# Patient Record
Sex: MALE | Race: ASIAN | NOT HISPANIC OR LATINO
[De-identification: names, ages, dates, MRNs, and addresses within clinical notes are randomized per-mention and may not be internally consistent; named-entity substitution may affect disease eponyms.]

---

## 2018-10-02 PROBLEM — Z00.00 ENCOUNTER FOR PREVENTIVE HEALTH EXAMINATION: Status: ACTIVE | Noted: 2018-10-02

## 2018-12-12 ENCOUNTER — APPOINTMENT (OUTPATIENT)
Dept: PAIN MANAGEMENT | Facility: CLINIC | Age: 23
End: 2018-12-12

## 2018-12-17 ENCOUNTER — APPOINTMENT (OUTPATIENT)
Dept: PAIN MANAGEMENT | Facility: CLINIC | Age: 23
End: 2018-12-17
Payer: COMMERCIAL

## 2018-12-17 VITALS
DIASTOLIC BLOOD PRESSURE: 79 MMHG | BODY MASS INDEX: 23.34 KG/M2 | WEIGHT: 154 LBS | HEIGHT: 68 IN | SYSTOLIC BLOOD PRESSURE: 140 MMHG | HEART RATE: 72 BPM

## 2018-12-17 PROCEDURE — 99204 OFFICE O/P NEW MOD 45 MIN: CPT

## 2021-03-22 ENCOUNTER — NON-APPOINTMENT (OUTPATIENT)
Age: 26
End: 2021-03-22

## 2021-03-22 ENCOUNTER — APPOINTMENT (OUTPATIENT)
Dept: DERMATOLOGY | Facility: CLINIC | Age: 26
End: 2021-03-22
Payer: COMMERCIAL

## 2021-03-22 VITALS — HEIGHT: 68 IN | BODY MASS INDEX: 23.49 KG/M2 | WEIGHT: 155 LBS

## 2021-03-22 DIAGNOSIS — L73.0 ACNE KELOID: ICD-10-CM

## 2021-03-22 PROCEDURE — 99204 OFFICE O/P NEW MOD 45 MIN: CPT

## 2021-03-22 PROCEDURE — 99072 ADDL SUPL MATRL&STAF TM PHE: CPT

## 2021-03-22 RX ORDER — FLUOCINONIDE 0.5 MG/ML
0.05 SOLUTION TOPICAL
Qty: 1 | Refills: 3 | Status: ACTIVE | COMMUNITY
Start: 2021-03-22 | End: 1900-01-01

## 2021-04-01 ENCOUNTER — NON-APPOINTMENT (OUTPATIENT)
Age: 26
End: 2021-04-01

## 2021-04-01 ENCOUNTER — APPOINTMENT (OUTPATIENT)
Dept: OPHTHALMOLOGY | Facility: CLINIC | Age: 26
End: 2021-04-01
Payer: COMMERCIAL

## 2021-04-01 PROCEDURE — 99072 ADDL SUPL MATRL&STAF TM PHE: CPT

## 2021-04-01 PROCEDURE — 99204 OFFICE O/P NEW MOD 45 MIN: CPT

## 2021-04-01 PROCEDURE — 92083 EXTENDED VISUAL FIELD XM: CPT

## 2021-05-06 ENCOUNTER — APPOINTMENT (OUTPATIENT)
Dept: OPHTHALMOLOGY | Facility: CLINIC | Age: 26
End: 2021-05-06

## 2021-06-08 ENCOUNTER — APPOINTMENT (OUTPATIENT)
Dept: ENDOCRINOLOGY | Facility: CLINIC | Age: 26
End: 2021-06-08

## 2021-06-22 ENCOUNTER — APPOINTMENT (OUTPATIENT)
Dept: DERMATOLOGY | Facility: CLINIC | Age: 26
End: 2021-06-22
Payer: COMMERCIAL

## 2021-06-22 DIAGNOSIS — L72.0 EPIDERMAL CYST: ICD-10-CM

## 2021-06-22 PROCEDURE — 99214 OFFICE O/P EST MOD 30 MIN: CPT | Mod: 25

## 2021-06-22 PROCEDURE — 11900 INJECT SKIN LESIONS </W 7: CPT

## 2021-09-29 ENCOUNTER — APPOINTMENT (OUTPATIENT)
Dept: ORTHOPEDIC SURGERY | Facility: CLINIC | Age: 26
End: 2021-09-29
Payer: COMMERCIAL

## 2021-09-29 ENCOUNTER — NON-APPOINTMENT (OUTPATIENT)
Age: 26
End: 2021-09-29

## 2021-09-29 VITALS
SYSTOLIC BLOOD PRESSURE: 126 MMHG | WEIGHT: 150 LBS | BODY MASS INDEX: 22.22 KG/M2 | DIASTOLIC BLOOD PRESSURE: 80 MMHG | HEART RATE: 59 BPM | HEIGHT: 69 IN

## 2021-09-29 PROCEDURE — 20550 NJX 1 TENDON SHEATH/LIGAMENT: CPT | Mod: LT

## 2021-09-29 PROCEDURE — 73130 X-RAY EXAM OF HAND: CPT | Mod: LT

## 2021-09-29 PROCEDURE — 73140 X-RAY EXAM OF FINGER(S): CPT | Mod: RT

## 2021-09-29 PROCEDURE — 99204 OFFICE O/P NEW MOD 45 MIN: CPT | Mod: 25

## 2021-09-29 RX ADMIN — BETAMETHASONE SODIUM PHOSPHATE AND BETAMETHASONE ACETATE 1 MG/ML: 3; 3 INJECTION, SUSPENSION INTRA-ARTICULAR; INTRALESIONAL; INTRAMUSCULAR; SOFT TISSUE at 00:00

## 2021-09-29 RX ADMIN — LIDOCAINE HYDROCHLORIDE 0.5 %: 10 INJECTION, SOLUTION INFILTRATION; PERINEURAL at 00:00

## 2021-09-29 NOTE — DISCUSSION/SUMMARY
[FreeTextEntry1] : He has findings consistent with a right thumb MCP joint radial collateral ligament sprain after an injury while playing basketball 13 days ago without instability.  He also has what appears to be a mild chronic left index finger trigger finger.\par \par I had a discussion with the patient regarding today's visit, the prognosis of this diagnosis, and treatment recommendations and options. With regard to the right thumb injury, he was fitted with a thumb spica splint and instructed on activity modification.  We will follow-up in 3 to 4 weeks to assess his progress.\par \par With regard to the left index finger, I recommended at this time a cortisone injection into the flexor tendon sheath. I told him I cannot guarantee that this will provide him with long term relief and if it does not I would then recommend he return to this office to discuss further treatment options. \par \par He has agreed to the above plan of management and has expressed full understanding.  All questions were fully answered to their satisfaction. \par \par My cumulative time spent on this visit included: Preparation for the visit, review of the medical records, review of pertinent diagnostic studies, examination and counseling of the patient on the above diagnosis, treatment plan and prognosis, orders of diagnostic tests, medication and/or appropriate procedures and documentation in the medical records of today's visit.

## 2021-09-29 NOTE — END OF VISIT
[FreeTextEntry3] : This note was written by Sabina Cervantes on 09/29/2021 acting solely as a scribe for Dr. Pramod Lunsford.\par  \par All medical record entries made by the Scribe were at my, Dr. Pramod Lunsford, direction and personally dictated by me on 09/29/2021. I have personally reviewed the chart and agree that the record accurately reflects my personal performance of the history, physical exam, assessment and plan.

## 2021-09-29 NOTE — PHYSICAL EXAM
[de-identified] : - Constitutional: This is a male in no obvious distress.  \par - Psych: Patient is alert and oriented to person, place and time.  Patient has a normal mood and affect.\par - Cardiovascular: Normal pulses throughout the upper extremities.  No significant varicosities are noted in the upper extremities. \par - Neuro: Strength and sensation are intact throughout the upper extremities.  Patient has normal coordination.\par - Respiratory:  Patient exhibits no evidence of shortness of breath or difficulty breathing.\par - Skin: No rashes, lesions, or other abnormalities are noted in the upper extremities.\par \par --- \par \par Examination of his right thumb demonstrates no obvious swelling.  He is tender along the MCP joint radial collateral ligament.  There is no swelling or tenderness along the ulnar collateral ligament.  There is no instability to stress testing of the MCP joint radial or ulnar collateral ligaments.  He is neurovascular intact distally.\par \par Examination of his left hand demonstrates no obvious swelling at the index finger.  He has mild tenderness along the PIP joint of the index finger.  There is triggering with flexion and extension of the digit.  There is no localized swelling or tenderness along the A1 pulley.  He has full motion.  He is neurovascularly intact distally. [de-identified] : AP, lateral, and oblique radiographs of the right thumb demonstrate no obvious fractures or dislocations.\par \par AP, lateral and oblique radiographs of his left hand demonstrate no arthritis or bony or soft tissue abnormalities.

## 2021-09-29 NOTE — PROCEDURE
[FreeTextEntry1] : -  After a discussion of risks and benefits, the patient agreed to proceed with a cortisone injection.  \par -  Side: Left\par -  Finger: Index trigger finger\par -  Medications: 0.5 cc of 1% Lidocaine and 1 cc of betamethasone, 6mg/cc, using sterile technique.\par -  Patient tolerated the procedure well, without complications.\par -  Patient was told that the symptoms may worsen for a day or two, and should then begin to improve. \par -  Instructions: Patient was instructed on activity modification for the next several days.\par -  Follow-up: Within 4 weeks to assess response to the injection.

## 2021-09-29 NOTE — HISTORY OF PRESENT ILLNESS
[Right] : right hand dominant [FreeTextEntry1] : He comes in today for evaluation of a right thumb injury which occurred on 9/16/21 at which time he was playing basketball. He notes swelling and a loss of dexterity. He has difficulty with gripping, He rates his pain as a 5 out of 10 at this time. He has a second complain of left index finger pain. He denies injury but notes the finger has been stiff for quite some time. He denies injury. He complains of snapping and cracking to the finger. He rates his pain as a 3 out of 10 at this time.

## 2021-09-29 NOTE — ADDENDUM
[FreeTextEntry1] : I, Sabina Cervantes, acted solely as a scribe for Dr. Lunsford on this date on 09/29/2021.

## 2021-10-01 RX ORDER — BETAMETHA AC,SOD PHOS/WATER/PF 6 MG/ML
6 (3-3) VIAL (ML) INJECTION
Qty: 1 | Refills: 0 | Status: COMPLETED | OUTPATIENT
Start: 2021-09-29

## 2021-10-01 RX ORDER — LIDOCAINE HYDROCHLORIDE 10 MG/ML
1 INJECTION, SOLUTION INFILTRATION; PERINEURAL
Refills: 0 | Status: COMPLETED | OUTPATIENT
Start: 2021-09-29

## 2021-10-10 ENCOUNTER — NON-APPOINTMENT (OUTPATIENT)
Age: 26
End: 2021-10-10

## 2021-10-13 PROBLEM — M65.322 TRIGGER INDEX FINGER OF LEFT HAND: Status: ACTIVE | Noted: 2021-09-29

## 2021-10-13 PROBLEM — S63.641A SPRAIN OF METACARPOPHALANGEAL (MCP) JOINT OF RIGHT THUMB: Status: ACTIVE | Noted: 2021-09-29

## 2021-10-20 ENCOUNTER — APPOINTMENT (OUTPATIENT)
Dept: ORTHOPEDIC SURGERY | Facility: CLINIC | Age: 26
End: 2021-10-20
Payer: COMMERCIAL

## 2021-10-20 DIAGNOSIS — S63.641A SPRAIN OF METACARPOPHALANGEAL JOINT OF RIGHT THUMB, INITIAL ENCOUNTER: ICD-10-CM

## 2021-10-20 DIAGNOSIS — M65.322 TRIGGER FINGER, LEFT INDEX FINGER: ICD-10-CM

## 2021-10-20 PROCEDURE — 99214 OFFICE O/P EST MOD 30 MIN: CPT

## 2021-10-20 NOTE — HISTORY OF PRESENT ILLNESS
[FreeTextEntry1] : Follow-up regarding right thumb MCP joint radial collateral ligament sprain after an injury while playing basketball 5 weeks ago and a mild chronic left index finger trigger finger.\par \par See note from when he was seen in the office 3 weeks ago.  He was fitted with a right thumb spica splint and given a cortisone injection at his left index finger trigger finger.\par \par He notes less pain to the right thumb with improvement noted. He has been wearing a right thumb spica splint. He rates his pain currently as a 3 out of 10 in this regard. With regard to the left index finger trigger finger, he also notes improvements and denies locking and pain. He does however state the finger intermittency clicks.

## 2021-10-20 NOTE — END OF VISIT
[FreeTextEntry3] : This note was written by Sabina Cervantes on 10/20/2021 acting solely as a scribe for Dr. Pramod Lunsford.\par  \par All medical record entries made by the Scribe were at my, Dr. Pramod Lunsford, direction and personally dictated by me on 10/20/2021. I have personally reviewed the chart and agree that the record accurately reflects my personal performance of the history, physical exam, assessment and plan.

## 2021-10-20 NOTE — ADDENDUM
[FreeTextEntry1] : I, Sabina Cervantes, acted solely as a scribe for Dr. Lunsford on this date on 10/20/2021.

## 2021-10-20 NOTE — DISCUSSION/SUMMARY
[FreeTextEntry1] : I had a discussion regarding today's visit, the diagnosis and treatment recommendations and options.  We also discussed changes since the last visit.  \par \par With regard to the right thumb, he was instructed to begin weaning off of the splint. He was instructed on activity modification for another one month. If he is feeling better after 1 month, he may return to regular activities as tolerated. He will follow up on an as needed basis in this regard.\par \par With regard to the left index finger trigger finger, as his symptoms have improved, I have recommended observation. He will follow up if his symptoms worsen in the future.\par \par The patient has agreed to the above plan of management and has expressed full understanding.  All questions were fully answered to the patient's satisfaction.\par \par My cumulative time spent on today's visit was greater than 30 minutes and included: Preparation for the visit, review of the medical records, review of pertinent diagnostic studies, examination and counseling of the patient on the above diagnosis, treatment plan and prognosis, orders of diagnostic tests, medications and/or appropriate procedures and documentation in the medical records of today's visit.

## 2021-10-20 NOTE — PHYSICAL EXAM
[de-identified] : - Constitutional: This is a male in no obvious distress.  \par - Psych: Patient is alert and oriented to person, place and time.  Patient has a normal mood and affect.\par - Cardiovascular: Normal pulses throughout the upper extremities.  No significant varicosities are noted in the upper extremities. \par - Neuro: Strength and sensation are intact throughout the upper extremities.  Patient has normal coordination.\par - Respiratory:  Patient exhibits no evidence of shortness of breath or difficulty breathing.\par - Skin: No rashes, lesions, or other abnormalities are noted in the upper extremities.\par \par --- \par \par Examination of his right thumb demonstrates no obvious swelling.  He is no longer tender along the MCP joint radial collateral ligament.  There is no swelling or tenderness along the ulnar collateral ligament.  There is no instability to stress testing of the MCP joint radial or ulnar collateral ligaments.  He is neurovascular intact distally.\par \par Examination of his left hand demonstrates no obvious swelling at the index finger.  He has mild residual tenderness along the PIP joint of the index finger.  There is residual triggering which is much improved.  He has full motion.  He remains neurovascularly intact distally. [de-identified] : Previous AP, lateral, and oblique radiographs of the right thumb demonstrated no obvious fractures or dislocations.\par \par Previous AP, lateral and oblique radiographs of his left hand demonstrated no arthritis or bony or soft tissue abnormalities.

## 2021-12-07 ENCOUNTER — APPOINTMENT (OUTPATIENT)
Dept: DERMATOLOGY | Facility: CLINIC | Age: 26
End: 2021-12-07
Payer: COMMERCIAL

## 2021-12-07 DIAGNOSIS — L73.9 FOLLICULAR DISORDER, UNSPECIFIED: ICD-10-CM

## 2021-12-07 DIAGNOSIS — L64.9 ANDROGENIC ALOPECIA, UNSPECIFIED: ICD-10-CM

## 2021-12-07 PROCEDURE — 99214 OFFICE O/P EST MOD 30 MIN: CPT | Mod: GC

## 2021-12-07 RX ORDER — TRETINOIN 0.25 MG/G
0.03 CREAM TOPICAL
Qty: 1 | Refills: 3 | Status: ACTIVE | COMMUNITY
Start: 2021-03-22 | End: 1900-01-01

## 2022-02-03 ENCOUNTER — APPOINTMENT (OUTPATIENT)
Dept: OTOLARYNGOLOGY | Facility: CLINIC | Age: 27
End: 2022-02-03
Payer: COMMERCIAL

## 2022-02-03 VITALS
BODY MASS INDEX: 22.73 KG/M2 | TEMPERATURE: 98 F | WEIGHT: 150 LBS | DIASTOLIC BLOOD PRESSURE: 79 MMHG | SYSTOLIC BLOOD PRESSURE: 128 MMHG | HEART RATE: 66 BPM | HEIGHT: 68 IN

## 2022-02-03 DIAGNOSIS — J31.0 CHRONIC RHINITIS: ICD-10-CM

## 2022-02-03 DIAGNOSIS — J34.2 DEVIATED NASAL SEPTUM: ICD-10-CM

## 2022-02-03 DIAGNOSIS — Z83.3 FAMILY HISTORY OF DIABETES MELLITUS: ICD-10-CM

## 2022-02-03 DIAGNOSIS — D75.1 SECONDARY POLYCYTHEMIA: ICD-10-CM

## 2022-02-03 DIAGNOSIS — J34.3 HYPERTROPHY OF NASAL TURBINATES: ICD-10-CM

## 2022-02-03 DIAGNOSIS — Z78.9 OTHER SPECIFIED HEALTH STATUS: ICD-10-CM

## 2022-02-03 DIAGNOSIS — M95.0 ACQUIRED DEFORMITY OF NOSE: ICD-10-CM

## 2022-02-03 PROCEDURE — 31231 NASAL ENDOSCOPY DX: CPT

## 2022-02-03 PROCEDURE — 99203 OFFICE O/P NEW LOW 30 MIN: CPT | Mod: 25

## 2022-02-03 RX ORDER — DOXYCYCLINE HYCLATE 100 MG/1
100 CAPSULE ORAL
Qty: 60 | Refills: 2 | Status: DISCONTINUED | COMMUNITY
Start: 2021-03-22 | End: 2022-02-03

## 2022-02-03 RX ORDER — MAGNESIUM OXIDE/MAG AA CHELATE 300 MG
CAPSULE ORAL
Refills: 0 | Status: ACTIVE | COMMUNITY

## 2022-02-03 RX ORDER — DOXYCYCLINE 100 MG/1
100 TABLET, FILM COATED ORAL
Refills: 0 | Status: DISCONTINUED | COMMUNITY
End: 2022-02-03

## 2022-02-03 RX ORDER — DOXYCYCLINE HYCLATE 100 MG/1
100 TABLET, DELAYED RELEASE ORAL
Qty: 30 | Refills: 2 | Status: DISCONTINUED | COMMUNITY
Start: 2021-06-22 | End: 2022-02-03

## 2022-02-03 RX ORDER — SODIUM CHLORIDE 0.65 %
0.65 AEROSOL, SPRAY (ML) NASAL
Qty: 1 | Refills: 2 | Status: ACTIVE | COMMUNITY
Start: 2022-02-03 | End: 1900-01-01

## 2022-02-03 RX ORDER — FLUTICASONE PROPIONATE 50 UG/1
50 SPRAY, METERED NASAL TWICE DAILY
Qty: 1 | Refills: 2 | Status: ACTIVE | COMMUNITY
Start: 2022-02-03 | End: 1900-01-01

## 2022-02-03 NOTE — REASON FOR VISIT
[Initial Evaluation] : an initial evaluation for [FreeTextEntry2] : difficulty breathing out of nose

## 2022-02-03 NOTE — PROCEDURE
[FreeTextEntry1] : Nasal endoscopy [FreeTextEntry2] : Nasal obstruction [FreeTextEntry3] : Procedure: nasal endoscopy \par \par Pre-operative diagnosis:  nasal obstruction \par \par Indication: Anterior rhinoscopy insufficient to diagnose pathology\par \par Details:\par After decongestant and lidocaine was sprayed in the bilateral nasal cavities, a flexible laryngoscope was inserted into the right nares. The nasal cavity, middle meatus, nasopharynx, and glottis were visualized. The endoscope was then inserted into the left nares and the nasal cavity was visualized. The patient tolerated procedure well.\par \par Results:\par Left septal deviation\par Right nasal cavity: clear without masses or lesions, clear secretions\par Right inferior turbinate: edematous, hypertrophy\par Right middle turbinate: normal\par Right middle meatus: normal without masses, pus\par Right ETO: normal\par Left nasal cavity: clear without masses or lesions, clear secretions\par Left inferior turbinate: edematous, hypertrophy\par Left middle turbinate: normal\par Left middle meatus: normal without masses, pus\par Left ETO: normal\par Nasopharynx: normal without masses or lesions\par \par Findings:\par Bilateral inferior turbinate hypertrophy, septal deviation

## 2022-02-03 NOTE — PHYSICAL EXAM
[] : septum deviated to the left [de-identified] : hypertrophy, edema [Midline] : trachea located in midline position [Normal] : no rashes

## 2022-02-03 NOTE — ASSESSMENT
[FreeTextEntry1] : 26 year old male with left septal deviation, bilateral inferior turbinate hypertrophy. We will trial flonase and nss. He will return in 6-8 weeks.

## 2022-02-03 NOTE — HISTORY OF PRESENT ILLNESS
[de-identified] : 26 year male presents for initial evaluation for difficulty breathing out of nose\par Recently diagnosed with Polycythemia \par Reports nasal trauma in 07/2021 he got hit in the face with a basketball.\par States intermittent nasal congestion, intermittent sinus pain/pressure.\par Denies post nasal drip, nasal discharge. States/denies sinus infections in the past year, treated with antibiotics.  Denies use of steroidal nasal sprays in the past. Denies CAT scan of sinuses in. \par SCHNOS O: 75\par SCHNOS C: 0\par NOSE: 65

## 2022-02-18 ENCOUNTER — APPOINTMENT (OUTPATIENT)
Dept: DERMATOLOGY | Facility: CLINIC | Age: 27
End: 2022-02-18
Payer: COMMERCIAL

## 2022-02-18 DIAGNOSIS — L70.0 ACNE VULGARIS: ICD-10-CM

## 2022-02-18 PROCEDURE — 99214 OFFICE O/P EST MOD 30 MIN: CPT

## 2022-02-18 RX ORDER — CLINDAMYCIN PHOSPHATE 10 MG/ML
1 LOTION TOPICAL
Qty: 1 | Refills: 2 | Status: ACTIVE | COMMUNITY
Start: 2021-03-22 | End: 1900-01-01

## 2022-02-18 RX ORDER — KETOCONAZOLE 20.5 MG/ML
2 SHAMPOO, SUSPENSION TOPICAL
Qty: 1 | Refills: 4 | Status: ACTIVE | COMMUNITY
Start: 2021-03-22 | End: 1900-01-01

## 2022-03-29 ENCOUNTER — APPOINTMENT (OUTPATIENT)
Dept: OPHTHALMOLOGY | Facility: CLINIC | Age: 27
End: 2022-03-29

## 2022-03-31 ENCOUNTER — APPOINTMENT (OUTPATIENT)
Dept: OTOLARYNGOLOGY | Facility: CLINIC | Age: 27
End: 2022-03-31

## 2022-05-13 ENCOUNTER — APPOINTMENT (OUTPATIENT)
Dept: DERMATOLOGY | Facility: CLINIC | Age: 27
End: 2022-05-13
Payer: COMMERCIAL

## 2022-05-13 DIAGNOSIS — L21.9 SEBORRHEIC DERMATITIS, UNSPECIFIED: ICD-10-CM

## 2022-05-13 PROCEDURE — 99214 OFFICE O/P EST MOD 30 MIN: CPT

## 2022-05-13 RX ORDER — CLOBETASOL PROPIONATE 0.5 MG/ML
0.05 SOLUTION TOPICAL
Qty: 1 | Refills: 2 | Status: ACTIVE | COMMUNITY
Start: 2022-05-13 | End: 1900-01-01

## 2022-05-16 LAB
ALBUMIN SERPL ELPH-MCNC: 5 G/DL
ALP BLD-CCNC: 64 U/L
ALT SERPL-CCNC: 14 U/L
AST SERPL-CCNC: 15 U/L
BASOPHILS # BLD AUTO: 0.04 K/UL
BASOPHILS NFR BLD AUTO: 0.6 %
BILIRUB DIRECT SERPL-MCNC: 0.2 MG/DL
BILIRUB INDIRECT SERPL-MCNC: 0.7 MG/DL
BILIRUB SERPL-MCNC: 0.9 MG/DL
EOSINOPHIL # BLD AUTO: 0.12 K/UL
EOSINOPHIL NFR BLD AUTO: 1.9 %
HCT VFR BLD CALC: 53.8 %
HGB BLD-MCNC: 18.3 G/DL
IMM GRANULOCYTES NFR BLD AUTO: 0.2 %
LYMPHOCYTES # BLD AUTO: 1.15 K/UL
LYMPHOCYTES NFR BLD AUTO: 18.6 %
MAN DIFF?: NORMAL
MCHC RBC-ENTMCNC: 30.2 PG
MCHC RBC-ENTMCNC: 34 GM/DL
MCV RBC AUTO: 88.8 FL
MONOCYTES # BLD AUTO: 0.56 K/UL
MONOCYTES NFR BLD AUTO: 9.1 %
NEUTROPHILS # BLD AUTO: 4.29 K/UL
NEUTROPHILS NFR BLD AUTO: 69.6 %
PLATELET # BLD AUTO: 171 K/UL
PROT SERPL-MCNC: 7.8 G/DL
RBC # BLD: 6.06 M/UL
RBC # FLD: 13.8 %
TRIGL SERPL-MCNC: 65 MG/DL
WBC # FLD AUTO: 6.17 K/UL

## 2022-08-05 ENCOUNTER — TRANSCRIPTION ENCOUNTER (OUTPATIENT)
Age: 27
End: 2022-08-05

## 2022-09-12 ENCOUNTER — APPOINTMENT (OUTPATIENT)
Dept: DERMATOLOGY | Facility: CLINIC | Age: 27
End: 2022-09-12

## 2022-09-12 PROCEDURE — 99214 OFFICE O/P EST MOD 30 MIN: CPT

## 2022-09-14 ENCOUNTER — NON-APPOINTMENT (OUTPATIENT)
Age: 27
End: 2022-09-14

## 2022-09-14 LAB
ALBUMIN SERPL ELPH-MCNC: 5.1 G/DL
ALP BLD-CCNC: 66 U/L
ALT SERPL-CCNC: 112 U/L
AST SERPL-CCNC: 67 U/L
BILIRUB DIRECT SERPL-MCNC: 0.2 MG/DL
BILIRUB INDIRECT SERPL-MCNC: 0.6 MG/DL
BILIRUB SERPL-MCNC: 0.7 MG/DL
PROT SERPL-MCNC: 7.8 G/DL
TRIGL SERPL-MCNC: 67 MG/DL

## 2022-09-16 LAB
ALBUMIN SERPL ELPH-MCNC: 5 G/DL
ALP BLD-CCNC: 63 U/L
ALT SERPL-CCNC: 73 U/L
AST SERPL-CCNC: 23 U/L
BILIRUB DIRECT SERPL-MCNC: 0.2 MG/DL
BILIRUB INDIRECT SERPL-MCNC: 0.6 MG/DL
BILIRUB SERPL-MCNC: 0.8 MG/DL
PROT SERPL-MCNC: 7.8 G/DL

## 2022-10-11 ENCOUNTER — APPOINTMENT (OUTPATIENT)
Dept: DERMATOLOGY | Facility: CLINIC | Age: 27
End: 2022-10-11

## 2022-11-04 ENCOUNTER — APPOINTMENT (OUTPATIENT)
Dept: DERMATOLOGY | Facility: CLINIC | Age: 27
End: 2022-11-04

## 2022-11-04 PROCEDURE — 99214 OFFICE O/P EST MOD 30 MIN: CPT

## 2022-12-13 ENCOUNTER — APPOINTMENT (OUTPATIENT)
Dept: DERMATOLOGY | Facility: CLINIC | Age: 27
End: 2022-12-13

## 2023-02-02 ENCOUNTER — APPOINTMENT (OUTPATIENT)
Dept: DERMATOLOGY | Facility: CLINIC | Age: 28
End: 2023-02-02

## 2023-02-03 ENCOUNTER — APPOINTMENT (OUTPATIENT)
Dept: DERMATOLOGY | Facility: CLINIC | Age: 28
End: 2023-02-03
Payer: COMMERCIAL

## 2023-02-03 PROCEDURE — 99214 OFFICE O/P EST MOD 30 MIN: CPT

## 2023-03-03 ENCOUNTER — APPOINTMENT (OUTPATIENT)
Dept: DERMATOLOGY | Facility: CLINIC | Age: 28
End: 2023-03-03
Payer: COMMERCIAL

## 2023-03-23 ENCOUNTER — NON-APPOINTMENT (OUTPATIENT)
Age: 28
End: 2023-03-23

## 2023-03-24 ENCOUNTER — APPOINTMENT (OUTPATIENT)
Dept: DERMATOLOGY | Facility: CLINIC | Age: 28
End: 2023-03-24
Payer: COMMERCIAL

## 2023-03-24 DIAGNOSIS — L91.0 HYPERTROPHIC SCAR: ICD-10-CM

## 2023-03-24 PROCEDURE — 99214 OFFICE O/P EST MOD 30 MIN: CPT

## 2023-03-24 RX ORDER — CLOBETASOL PROPIONATE 0.5 MG/G
0.05 CREAM TOPICAL
Qty: 1 | Refills: 1 | Status: ACTIVE | COMMUNITY
Start: 2023-03-24 | End: 1900-01-01

## 2023-03-24 RX ORDER — ISOTRETINOIN 30 MG/1
30 CAPSULE, LIQUID FILLED ORAL
Qty: 1 | Refills: 0 | Status: ACTIVE | COMMUNITY
Start: 2022-11-04 | End: 1900-01-01

## 2023-03-27 ENCOUNTER — TRANSCRIPTION ENCOUNTER (OUTPATIENT)
Age: 28
End: 2023-03-27

## 2023-03-27 LAB
ALBUMIN SERPL ELPH-MCNC: 5.1 G/DL
ALP BLD-CCNC: 62 U/L
ALT SERPL-CCNC: 17 U/L
AST SERPL-CCNC: 16 U/L
BILIRUB DIRECT SERPL-MCNC: 0.2 MG/DL
BILIRUB INDIRECT SERPL-MCNC: 0.6 MG/DL
BILIRUB SERPL-MCNC: 0.8 MG/DL
PROT SERPL-MCNC: 7.9 G/DL
TRIGL SERPL-MCNC: 84 MG/DL

## 2023-04-17 ENCOUNTER — APPOINTMENT (OUTPATIENT)
Dept: DERMATOLOGY | Facility: CLINIC | Age: 28
End: 2023-04-17
Payer: COMMERCIAL

## 2023-04-17 DIAGNOSIS — L70.0 ACNE VULGARIS: ICD-10-CM

## 2023-04-17 DIAGNOSIS — Z79.899 OTHER LONG TERM (CURRENT) DRUG THERAPY: ICD-10-CM

## 2023-04-17 DIAGNOSIS — L30.9 DERMATITIS, UNSPECIFIED: ICD-10-CM

## 2023-04-17 PROCEDURE — 11900 INJECT SKIN LESIONS </W 7: CPT

## 2023-04-17 PROCEDURE — 99214 OFFICE O/P EST MOD 30 MIN: CPT | Mod: 25

## 2023-04-17 RX ORDER — CLOBETASOL PROPIONATE 0.5 MG/G
0.05 OINTMENT TOPICAL
Qty: 1 | Refills: 0 | Status: ACTIVE | COMMUNITY
Start: 2023-02-03 | End: 1900-01-01

## 2023-04-17 NOTE — PHYSICAL EXAM
[Alert] : alert [Oriented x 3] : ~L oriented x 3 [Well Nourished] : well nourished [Conjunctiva Non-injected] : conjunctiva non-injected [No Visual Lymphadenopathy] : no visual  lymphadenopathy [No Clubbing] : no clubbing [No Edema] : no edema [No Bromhidrosis] : no bromhidrosis [No Chromhidrosis] : no chromhidrosis [FreeTextEntry3] : Exam notable for: \par - Just superior to left medial arch- 4cm x 3 cm annular plaque with pink shiny superior component and inferior portion with scaly, thick hyperkeratosis. Improved\par - just superior to right medial arch- small scaling pink plaques\par - Face clear\par - Few small comedones and hyperpigmented macules on the central forehead\par - facial xerosis\par - several hyperpigmented macules and scarring on the back \par \par

## 2023-04-17 NOTE — HISTORY OF PRESENT ILLNESS
[FreeTextEntry1] : f/u acne and itchy spot on foot- [de-identified] : 27 year old M here for:\par \par 1. F/u acne vulgaris, face and trunk-\par Since , has continued on isotretinoin 30mg QD. Acne has improved\par Mood has improved. No abdominal pain, bloody stool. No joint pains\par Reports dry eyes, has been using saline eye-drops.\par Denies joint pain, nausea, vomiting. \par \par History-\par 02/03/2023: Self-discontinued isotretinoin after ~2. 5 months. Increased to 60mg daily at . Pt stopped d/t xerosis and nosebleeds. \par - Currently washing with OTC Dove bar soap. Applying oil of olay moisturizer ( thinks it is non-comedogenic). \par 11/04/2022: s/p 2 month of isotretinoin, taking 40mg daily w/ food. Notes improvement in facial and back acne. Still having some breakouts on the chest. Has dry skin/dry nose + nose bleeds, some HA (baseline for pt who has migraines) but no joint pain, abdominal pain, change in mood. \par Baseline- Started on Isotretinoin 20mg May 13th. Previously with mild elevation in LFT that improved with re-check \par Acne- previously treated with isotretinoin for 1 year course in [place], Summer 2018 - 2019, unsure of cummulative dose but noted acne cleared completely. Also notes acne flaring on occipital scalp. \par \par 2. F/u well-demarcated plaque of eczema vs. psoriasis of the feet\par - Deferred bx at . Notes overall improvement with regular use of topical steroid and Cerave. \par History- \par 02/03/2023: Started as a small spot and progressively has grown in size. Tried OTC aloe vera and castor oil without relief. \par

## 2023-04-17 NOTE — ASSESSMENT
[FreeTextEntry1] : 1. Well-demarcated plaques on bilateral medial feet- L> R\par DDX eczema vs psoriasis - improved\par c/w clobetasol BID x 2 weeks on/1 week off. SED\par - Reviewed importance of moisturization. Patient to continue to apply CeraVe cream daily (encouraged application as often as his schedule allows). \par -- Recommend intralesional corticosteroid injection\par -- Verbal consent obtained.  Risks of IL-steroids discussed including pain, infxn, bleeding, skin atrophy and pigmentary changes.  Areas cleansed with isopropyl alcohol.  Injection of 0.5 cc Kenalog 10 mg/cc in total to all lesions.  Pt. tolerated procedure well.  No complications.  Total of 3 lesions injected. \par \par 2. Acne vulgaris, moderate\par Inflammatory and comedonal\par - Diagnosis and course of condition discussed\par - Previously had a 1 year course of isotretinoin done in [place]. However, patient reports that it was about 10mg QD x 1 year (total dose around 3600mg). Then also had courses of doxycycline-ER in the past\par - No improvement with consistent topical use\par -Tx options discussed\par - Extensively counseled Patient on Isotretinoin and follow-up required. Patient understands that he has to return for Isotretinoin appointments consistently. \par - Patient in agreement. Isotretinoin consent form previously signed. \par Weight: 70kg\par Goal Dose: 10,500-14,000mg (150-200mg/kg)\par Cumulative Dose *To have completed 6300mg\par - Month 1 (May 2022) 20mg daily (600mg total)\par - Month 2 (September 2022) 40mg daily (1200 mg total)\par - Month 3 (November 2022, Patient completed about 15 days) 60mg daily (1800mg prescribed)\par - Month 4 (February 3 2023-March 24) 30mg daily ( 1800 mg total once Patient completes remaining 6 tablets)\par - Month 5 - 30mg daily\par \par Increase to isotretinoin to 40mg QD\par \par 4. Encounter for High Risk Medication\par - Labs unremarkable at [facility]\par - Previously extensive counseling and discussion regarding use of accutane and side fx including:\par birth defects, depression, headache, blurred vision, dizziness, N/V, seizures, stroke, abdominal pain, trouble swallowing, heartburn, diarrhea, rectal bleeding, yellowing of skin or eyes, dark urine, bone and muscle aches, hearing problems, visual problems, lipid disturbances, allergic reactions, blood sugar problems, red or white blood cell problems, dry lips, eyes, skin.\par - Not to give blood, drive at night if affects vision, no cosmetic procedures or surgeries for 6 months after stopping since can have bad scarring, photosensitivity \par \par RTC 1 month

## 2023-04-25 RX ORDER — ISOTRETINOIN 40 MG/1
40 CAPSULE, LIQUID FILLED ORAL
Qty: 1 | Refills: 0 | Status: ACTIVE | COMMUNITY
Start: 2022-05-16 | End: 1900-01-01

## 2023-05-26 ENCOUNTER — APPOINTMENT (OUTPATIENT)
Dept: DERMATOLOGY | Facility: CLINIC | Age: 28
End: 2023-05-26

## 2023-11-07 ENCOUNTER — NON-APPOINTMENT (OUTPATIENT)
Age: 28
End: 2023-11-07

## 2023-11-10 ENCOUNTER — NON-APPOINTMENT (OUTPATIENT)
Age: 28
End: 2023-11-10

## 2023-11-10 ENCOUNTER — APPOINTMENT (OUTPATIENT)
Dept: UROLOGY | Facility: CLINIC | Age: 28
End: 2023-11-10
Payer: COMMERCIAL

## 2023-11-10 VITALS
BODY MASS INDEX: 21.22 KG/M2 | TEMPERATURE: 97.1 F | SYSTOLIC BLOOD PRESSURE: 128 MMHG | WEIGHT: 140 LBS | HEIGHT: 68 IN | HEART RATE: 64 BPM | RESPIRATION RATE: 16 BRPM | DIASTOLIC BLOOD PRESSURE: 87 MMHG

## 2023-11-10 DIAGNOSIS — N47.1 PHIMOSIS: ICD-10-CM

## 2023-11-10 PROCEDURE — 99203 OFFICE O/P NEW LOW 30 MIN: CPT

## 2023-12-18 ENCOUNTER — APPOINTMENT (OUTPATIENT)
Dept: UROLOGY | Facility: AMBULATORY SURGERY CENTER | Age: 28
End: 2023-12-18

## 2024-01-04 ENCOUNTER — OUTPATIENT (OUTPATIENT)
Dept: OUTPATIENT SERVICES | Facility: HOSPITAL | Age: 29
LOS: 1 days | End: 2024-01-04

## 2024-01-04 VITALS
WEIGHT: 136.03 LBS | HEIGHT: 68 IN | DIASTOLIC BLOOD PRESSURE: 76 MMHG | HEART RATE: 95 BPM | OXYGEN SATURATION: 93 % | SYSTOLIC BLOOD PRESSURE: 126 MMHG | RESPIRATION RATE: 16 BRPM | TEMPERATURE: 98 F

## 2024-01-04 DIAGNOSIS — N47.1 PHIMOSIS: ICD-10-CM

## 2024-01-04 DIAGNOSIS — R00.0 TACHYCARDIA, UNSPECIFIED: ICD-10-CM

## 2024-01-04 DIAGNOSIS — R79.81 ABNORMAL BLOOD-GAS LEVEL: ICD-10-CM

## 2024-01-04 DIAGNOSIS — Z87.74 PERSONAL HISTORY OF (CORRECTED) CONGENITAL MALFORMATIONS OF HEART AND CIRCULATORY SYSTEM: Chronic | ICD-10-CM

## 2024-01-04 NOTE — H&P PST ADULT - PROBLEM SELECTOR PLAN 3
HR  beats per min. Pt offered no symptoms.   Pt instructed to see Dr. Robison ([phone]) for consultation pre op.  Pending cardiology consultation. HR  beats per min. Pt offered no symptoms.   Pt instructed to see Dr. Robison ([phone]) for consultation pre op.  Pending cardiology consultation. HR  beats per min. Pt offered no symptoms.   Pt instructed to see Dr. Robison ([phone]) for consultation pre op.  Requested last cardiology consultation note (12/2023). HR  beats per min. Pt offered no symptoms.   Pt instructed to see Dr. Robison ([phone]) for consultation pre op.  Requested last cardiology consultation note (12/2023). HR  beats per min. Pt offered no symptoms.   Pt instructed to see Dr. Robison ([phone]) for consultation pre op.  Requested last cardiology consultation note, echocardiogram report (12/2023) and MRI 01/02/2024. HR  beats per min. Pt offered no symptoms.   Pt instructed to see Dr. Robison ([phone]) for consultation pre op.  Requested last cardiology consultation note, echocardiogram report (12/2023) and MRI 01/02/2024. HR  beats per min. Pt offered no symptoms.   Pt instructed to see Dr. Robison ([phone]) for consultation pre op.  Requested last echocardiogram report (12/2023) and MRI 01/02/2024. HR  beats per min. Pt offered no symptoms.   Pt instructed to see Dr. Robison ([phone]) for consultation pre op.  Requested last echocardiogram report (12/2023) and MRI 01/02/2024.

## 2024-01-04 NOTE — H&P PST ADULT - PROBLEM SELECTOR PLAN 4
NP spoke to Ivett, requested [facility] visit note 11/2023 NP spoke to Iris, requested last visit note 11/2023.   Note in chart

## 2024-01-04 NOTE — H&P PST ADULT - HISTORY OF PRESENT ILLNESS
29 y/o M with H/O:  27 y/o M with H/O:  29 y/o M with H/O: ASD repair in 1997 presents for pre op evaluation for circumcision tentatively on 01/08/24 27 y/o M with H/O: ASD repair in 1997 presents for pre op evaluation for circumcision tentatively on 01/08/24  Pt stated that he was initially scheduled for a procedure for gynecomastia on 11/28/23 but was cancelled by anesthesiologist due to H/O: low O2 sat, "usually run in the low 90's". As per pt, he was evaluated by endocrinologist in 2021 then referred to hematologist due to abn. lab tests. He was diagnosed with polycythemia by hematologist Dr. Gallego, then referred to cardiologist Dr. Robison. 29 y/o M with H/O: ASD repair in 1997 presents for pre op evaluation for circumcision tentatively on 01/08/24  Pt stated that he was initially scheduled for a procedure for gynecomastia on 11/28/23 but was cancelled by anesthesiologist due to H/O: low O2 sat, "usually run in the low 90's". As per pt, he was evaluated by endocrinologist in 2021 then referred to hematologist due to abn. lab tests. He was diagnosed with polycythemia by hematologist Dr. Gallego, then referred to cardiologist Dr. Robison. 29 y/o M with H/O: ASD repair in 1997 at 6 months old presents for pre op evaluation for circumcision tentatively on 01/08/24  Pt stated that he was initially scheduled for a procedure for gynecomastia on 11/28/23 but was cancelled by anesthesiologist due to H/O: low O2 sat, "usually run in the low 90's". As per pt, he was evaluated by endocrinologist in 2021 then referred to hematologist due to abn. lab tests. He was diagnosed with polycythemia by hematologist Dr. Glalego, then referred to cardiologist Dr. Robison. 27 y/o M with H/O: ASD repair in 1997 at 6 months old presents for pre op evaluation for circumcision tentatively on 01/08/24  Pt stated that he was initially scheduled for a procedure for gynecomastia on 11/28/23 but was cancelled by anesthesiologist due to H/O: low O2 sat, "usually run in the low 90's". As per pt, he was evaluated by endocrinologist in 2021 then referred to hematologist due to abn. lab tests. He was diagnosed with polycythemia by hematologist Dr. Gallego, then referred to cardiologist Dr. Robison.

## 2024-01-04 NOTE — H&P PST ADULT - PROBLEM SELECTOR PLAN 2
O2 Sat 88-92% during PST visit. Pt denies SOB, Dyspnea, fatigue, dizziness or any other symptoms.   As per pt, his O2 Sat has been low, he's being followed by cardiologist Dr. Robison. Cardiologist office closed at present time O2 Sat 88-92% during PST visit. Pt denies SOB, Dyspnea, fatigue, dizziness or any other symptoms.   As per pt, his O2 Sat has been low since 2021, he's being followed by cardiologist Dr. Robison. Cardiologist office closed at present time.  Requested echo report 12/2023.

## 2024-01-04 NOTE — H&P PST ADULT - OTHER CARE PROVIDERS
Dr. Terence Robison (Cardiologist) [phone] Dr. Terence Robison (Cardiologist) [phone] Dr. Terence Robison (Cardiologist) [phone]; Antelmo Martinez ( endocrinologist) [phone]; Dr. Osiris Gallego (Hematologist) [phone] Dr. Terence Robison (Cardiologist) [phone]; Antelmo Martinez ( endocrinologist) [phone]; Dr. Osiris Gallego (Hematologist) [phone]

## 2024-01-04 NOTE — H&P PST ADULT - NSICDXPASTMEDICALHX_GEN_ALL_CORE_FT
PAST MEDICAL HISTORY:  Phimosis      PAST MEDICAL HISTORY:  History of polycythemia     Phimosis      PAST MEDICAL HISTORY:  ASD (atrial septal defect)     History of polycythemia     Phimosis

## 2024-01-04 NOTE — H&P PST ADULT - CARDIOVASCULAR
regular rate and rhythm/S1 S2 present/no gallops/no rub/no murmur details… regular rate and rhythm/S1 S2 present/no gallops/no rub/no murmur/no pedal edema

## 2024-01-04 NOTE — H&P PST ADULT - PROBLEM SELECTOR PLAN 1
Schedule for circumcision tentatively on 01/08/24. Pre op instructions, famotidine given and explained. Pt verbalized understanding.

## 2024-01-04 NOTE — H&P PST ADULT - LYMPHATIC
posterior cervical L/No lymphadedenopathy posterior cervical L/posterior cervical R/anterior cervical L/anterior cervical R/supraclavicular L/supraclavicular R/No lymphadedenopathy

## 2024-01-05 DIAGNOSIS — D75.1 SECONDARY POLYCYTHEMIA: ICD-10-CM

## 2024-01-08 ENCOUNTER — APPOINTMENT (OUTPATIENT)
Dept: UROLOGY | Facility: AMBULATORY SURGERY CENTER | Age: 29
End: 2024-01-08